# Patient Record
Sex: MALE | Employment: OTHER | URBAN - METROPOLITAN AREA
[De-identification: names, ages, dates, MRNs, and addresses within clinical notes are randomized per-mention and may not be internally consistent; named-entity substitution may affect disease eponyms.]

---

## 2019-11-18 ENCOUNTER — TRANSFERRED RECORDS (OUTPATIENT)
Dept: PHYSICAL THERAPY | Facility: CLINIC | Age: 69
End: 2019-11-18

## 2019-12-03 ENCOUNTER — THERAPY VISIT (OUTPATIENT)
Dept: PHYSICAL THERAPY | Facility: CLINIC | Age: 69
End: 2019-12-03
Payer: COMMERCIAL

## 2019-12-03 DIAGNOSIS — M25.561 CHRONIC PAIN OF BOTH KNEES: ICD-10-CM

## 2019-12-03 DIAGNOSIS — G89.29 CHRONIC PAIN OF BOTH KNEES: ICD-10-CM

## 2019-12-03 DIAGNOSIS — M25.562 CHRONIC PAIN OF BOTH KNEES: ICD-10-CM

## 2019-12-03 DIAGNOSIS — Z96.651 HISTORY OF TOTAL KNEE ARTHROPLASTY, RIGHT: ICD-10-CM

## 2019-12-03 PROCEDURE — 97161 PT EVAL LOW COMPLEX 20 MIN: CPT | Mod: GP | Performed by: PHYSICAL THERAPIST

## 2019-12-03 PROCEDURE — 97110 THERAPEUTIC EXERCISES: CPT | Mod: GP | Performed by: PHYSICAL THERAPIST

## 2019-12-03 PROCEDURE — 97112 NEUROMUSCULAR REEDUCATION: CPT | Mod: GP | Performed by: PHYSICAL THERAPIST

## 2019-12-03 ASSESSMENT — ACTIVITIES OF DAILY LIVING (ADL)
GO DOWN STAIRS: ACTIVITY IS SOMEWHAT DIFFICULT
LIMPING: THE SYMPTOM AFFECTS MY ACTIVITY MODERATELY
KNEEL ON THE FRONT OF YOUR KNEE: ACTIVITY IS VERY DIFFICULT
PAIN: THE SYMPTOM AFFECTS MY ACTIVITY SEVERELY
RAW_SCORE: 24
GIVING WAY, BUCKLING OR SHIFTING OF KNEE: THE SYMPTOM AFFECTS MY ACTIVITY SEVERELY
WEAKNESS: THE SYMPTOM AFFECTS MY ACTIVITY SEVERELY
KNEE_ACTIVITY_OF_DAILY_LIVING_SUM: 24
STIFFNESS: THE SYMPTOM AFFECTS MY ACTIVITY SEVERELY
AS_A_RESULT_OF_YOUR_KNEE_INJURY,_HOW_WOULD_YOU_RATE_YOUR_CURRENT_LEVEL_OF_DAILY_ACTIVITY?: NOT ANSWERED
STAND: ACTIVITY IS FAIRLY DIFFICULT
HOW_WOULD_YOU_RATE_THE_OVERALL_FUNCTION_OF_YOUR_KNEE_DURING_YOUR_USUAL_DAILY_ACTIVITIES?: ABNORMAL
SIT WITH YOUR KNEE BENT: ACTIVITY IS FAIRLY DIFFICULT
SQUAT: ACTIVITY IS VERY DIFFICULT
RISE FROM A CHAIR: ACTIVITY IS VERY DIFFICULT
GO UP STAIRS: ACTIVITY IS FAIRLY DIFFICULT
KNEE_ACTIVITY_OF_DAILY_LIVING_SCORE: 34.29
WALK: ACTIVITY IS FAIRLY DIFFICULT
SWELLING: I HAVE THE SYMPTOM BUT IT DOES NOT AFFECT MY ACTIVITY

## 2019-12-03 NOTE — LETTER
[facility]THLETIC [facility] PHYSICAL THERAPY  [address]  [place] [address]  [phone]    2019    Re: Espinoza Copeland   :   1950  MRN:  [id number]   REFERRING PHYSICIAN:   Jarod Sheth    [facility]THLETIC [facility] PHYSICAL [facility]    Date of Initial Evaluation:  2019  Visits:  Rxs Used: 1  Reason for Referral:     Chronic pain of both knees  History of total knee arthroplasty, right    EVALUATION SUMMARY    Subjective:  Espinoza Copeland being seen for B knee pain.   Problem began 2019 (MD visit). Problem occurred: 3-4 months ago B knee pain  and reported as 4/10 on pain scale. General health as reported by patient is fair. Pertinent medical history includes:  Cancer, diabetes, osteoarthritis, rheumatoid arthritis and numbness/tingling (diabetic neuropathy tingling in B feet. numbness just around R knee since TKA).   Other medical allergies details: none.  Surgeries include:  Cancer surgery. Other surgery history details: bladder cancer.  Current medications:  Other (metformin, glipzoid, lorsatan).     Pain is described as shooting and aching and is intermittent.  Since onset symptoms are gradually worsening. Special tests:  X-ray (ruled out bone cancer).     Patient is retired.   Barriers include:  Stairs.  Red flags:  Pain at rest/night.  Type of problem:  Bilateral knees  Condition occurred with:  Insidious onset. This is a chronic condition    Patient reports pain:  Anterior and lateral. Radiates to:  Lower leg and thigh (anterior thigh, anterior shins). Associated symptoms:  Loss of motion/stiffness and loss of strength (clicking B knees, balance difficulty). Symptoms are exacerbated by ascending stairs, bending/squatting and walking (must use hands to get up from recliner chair, descending stairs one step at a time, walking concrete floors) and relieved by nothing (doesn't try anything).                   Objective:  Gait:    Gait Type:  Antalgic   Weight Bearing Status:  WBAT   Assistive Devices:  None  Flexibility/Screens:   Lower Extremity:  Decreased left lower extremity flexibility:Hamstrings; Gastroc and Soleus  Decreased right lower extremity flexibility:  Hamstrings; Gastroc and Soleus        Re: Espinoza Copeland   :   1950       Knee Evaluation:  ROM:  Prom wnl knee: tightness R>L gastroc soleus complex.  Strength wnl knee: MMT hip ABD L 4/5, R 3+/5, hip ext B 5-/5.  Extensor sag with B flexion SLR, improves with cueing continued tendency for lag.  AROM  Extension:  Left: 0    Right:  0  Flexion: Left: 133    Right: 124  PROM  Hyperextension: Left: 4    Right:  5  Strength:   Extension:  Left: 5/5   Pain:      Right: 5/5   Pain:  Flexion:  Left: 5/5   Pain:      Right: 5/5   Pain:    Quad Set Left: Good    Pain:   Quad Set Right: Good    Pain:  Ligament Testing:  Ligament testing knee: shift noted with return of valgus strain to R knee in 30 degrees of flexion.  Varus 0:  Left:  Neg   Right:  Neg  Varus 30:  Left:  Neg  Right:  Neg  Valgus 0:  Left:  Neg  Right:  Neg  Valgus 30:  Left:  Neg    Right:  Trace  Anterior Drawer:  Left:  Neg    Right:  Neg  Posterior Drawer: Left:  Neg  Right:  Neg  Special Tests:   Left knee positive for the following special tests:  Patellar Compression  Left knee negative for the following special tests:  Meninscal  Right knee negative for the following special tests:  Patellar Compression  Edema:  Edema of the knee: B ankle edema noted, some bruising around ankles (pt says has had for years).  Skin not red or taught.  Functional Testing:    Quad:    Single Leg Squat:  Left:       Right:        Bilateral Leg Squat:  B functional valgus, audible crepitus B knees  Excessive anterior knee excursion, hip substitution and femoral IR    Proprioception:   Stork Balance Test:  Left:  1 seconds hip drop  Right:  2 seconds hip drop  % of Uninvolved:     Assessment/Plan:     Patient is a 69 year old male with B  knee complaints.    Patient has the following significant findings with corresponding treatment plan.                Diagnosis 1:  B knee pain, R pes anserine bursitis, h/o R TKA  Pain -  hot/cold therapy, manual therapy, self management, education and home program  Decreased ROM/flexibility - manual therapy, therapeutic exercise and home program  Decreased strength - therapeutic exercise, therapeutic activities and home program  Impaired balance - neuro re-education, gait training, therapeutic activities and home program  Decreased proprioception - neuro re-education, gait training, therapeutic activities and home program  Re: Espinoza Copeland   :   1950    Edema - cold therapy and self management/home program  Impaired gait - gait training and home program  Decreased function - therapeutic activities and home program    Therapy Evaluation Codes:        Cumulative Therapy Evaluation is: Low complexity.    Previous and current functional limitations:  (See Goal Flow Sheet for this information)    Short term and Long term goals: (See Goal Flow Sheet for this information)     Communication ability:  Patient appears to be able to clearly communicate and understand verbal and written communication and follow directions correctly.  Treatment Explanation - The following has been discussed with the patient:   RX ordered/plan of care  Anticipated outcomes  Possible risks and side effects  This patient would benefit from PT intervention to resume normal activities.   Rehab potential is good.    Frequency:  1 X week, once daily  Duration:  for 10 weeks  Discharge Plan:  Achieve all LTG.  Independent in home treatment program.  Reach maximal therapeutic benefit.    Thank you for your referral.    INQUIRIES  Therapist: JALEN ElizabethT  [facility] PHYSICAL THERAPY  [address] [address]  Phone:  [phone]  Fax: [phone]

## 2019-12-03 NOTE — PROGRESS NOTES
[facility] Initial Evaluation  Subjective:    Espinoza Copeland being seen for B knee pain.   Problem began 11/18/2019 (MD visit). Problem occurred: 3-4 months ago B knee pain  and reported as 4/10 on pain scale. General health as reported by patient is fair. Pertinent medical history includes:  Cancer, diabetes, osteoarthritis, rheumatoid arthritis and numbness/tingling (diabetic neuropathy tingling in B feet. numbness just around R knee since TKA).   Other medical allergies details: none.  Surgeries include:  Cancer surgery. Other surgery history details: bladder cancer.  Current medications:  Other (metformin, glipzoid, lorsatan).     Pain is described as shooting and aching and is intermittent.  Since onset symptoms are gradually worsening. Special tests:  X-ray (ruled out bone cancer).     Patient is retired.   Barriers include:  Stairs.  Red flags:  Pain at rest/night.  Type of problem:  Bilateral knees   Condition occurred with:  Insidious onset. This is a chronic condition    Patient reports pain:  Anterior and lateral. Radiates to:  Lower leg and thigh (anterior thigh, anterior shins). Associated symptoms:  Loss of motion/stiffness and loss of strength (clicking B knees, balance difficulty). Symptoms are exacerbated by ascending stairs, bending/squatting and walking (must use hands to get up from recliner chair, descending stairs one step at a time, walking concrete floors) and relieved by nothing (doesn't try anything).                      Objective:    Gait:    Gait Type:  Antalgic   Weight Bearing Status:  WBAT   Assistive Devices:  None      Flexibility/Screens:       Lower Extremity:  Decreased left lower extremity flexibility:Hamstrings; Gastroc and Soleus    Decreased right lower extremity flexibility:  Hamstrings; Gastroc and Soleus                                                      Knee Evaluation:  ROM:  Prom wnl knee: tightness R>L gastroc soleus complex.  Strength wnl knee:  MMT hip ABD L 4/5, R 3+/5, hip ext B 5-/5.  Extensor sag with B flexion SLR, improves with cueing continued tendency for lag.  AROM      Extension:  Left: 0    Right:  0  Flexion: Left: 133    Right: 124  PROM    Hyperextension: Left: 4    Right:  5          Strength:     Extension:  Left: 5/5   Pain:      Right: 5/5   Pain:  Flexion:  Left: 5/5   Pain:      Right: 5/5   Pain:    Quad Set Left: Good    Pain:   Quad Set Right: Good    Pain:  Ligament Testing:  Ligament testing knee: shift noted with return of valgus strain to R knee in 30 degrees of flexion.  Varus 0:  Left:  Neg   Right:  Neg  Varus 30:  Left:  Neg  Right:  Neg  Valgus 0:  Left:  Neg  Right:  Neg  Valgus 30:  Left:  Neg    Right:  Trace  Anterior Drawer:  Left:  Neg    Right:  Neg  Posterior Drawer: Left:  Neg  Right:  Neg    Special Tests:   Left knee positive for the following special tests:  Patellar Compression  Left knee negative for the following special tests:  Meninscal    Right knee negative for the following special tests:  Patellar Compression    Edema:  Edema of the knee: B ankle edema noted, some bruising around ankles (pt says has had for years).  Skin not red or taught.      Functional Testing:          Quad:    Single Leg Squat:  Left:       Right:        Bilateral Leg Squat:  B functional valgus, audible crepitus B knees  Excessive anterior knee excursion, hip substitution and femoral IR      Proprioception:   Stork Balance Test:  Left:  1 seconds hip drop  Right:  2 seconds hip drop  % of Uninvolved:           General     ROS    Assessment/Plan:    Patient is a 69 year old male with B  knee complaints.    Patient has the following significant findings with corresponding treatment plan.                Diagnosis 1:  B knee pain, R pes anserine bursitis, h/o R TKA    Pain -  hot/cold therapy, manual therapy, self management, education and home program  Decreased ROM/flexibility - manual therapy, therapeutic exercise and home  program  Decreased strength - therapeutic exercise, therapeutic activities and home program  Impaired balance - neuro re-education, gait training, therapeutic activities and home program  Decreased proprioception - neuro re-education, gait training, therapeutic activities and home program  Edema - cold therapy and self management/home program  Impaired gait - gait training and home program  Decreased function - therapeutic activities and home program    Therapy Evaluation Codes:        Cumulative Therapy Evaluation is: Low complexity.    Previous and current functional limitations:  (See Goal Flow Sheet for this information)    Short term and Long term goals: (See Goal Flow Sheet for this information)     Communication ability:  Patient appears to be able to clearly communicate and understand verbal and written communication and follow directions correctly.  Treatment Explanation - The following has been discussed with the patient:   RX ordered/plan of care  Anticipated outcomes  Possible risks and side effects  This patient would benefit from PT intervention to resume normal activities.   Rehab potential is good.    Frequency:  1 X week, once daily  Duration:  for 10 weeks  Discharge Plan:  Achieve all LTG.  Independent in home treatment program.  Reach maximal therapeutic benefit.    Please refer to the daily flowsheet for treatment today, total treatment time and time spent performing 1:1 timed codes.

## 2019-12-10 ENCOUNTER — THERAPY VISIT (OUTPATIENT)
Dept: PHYSICAL THERAPY | Facility: CLINIC | Age: 69
End: 2019-12-10
Payer: COMMERCIAL

## 2019-12-10 DIAGNOSIS — G89.29 CHRONIC PAIN OF BOTH KNEES: ICD-10-CM

## 2019-12-10 DIAGNOSIS — M25.562 CHRONIC PAIN OF BOTH KNEES: ICD-10-CM

## 2019-12-10 DIAGNOSIS — Z96.651 HISTORY OF TOTAL KNEE ARTHROPLASTY, RIGHT: ICD-10-CM

## 2019-12-10 DIAGNOSIS — M25.561 CHRONIC PAIN OF BOTH KNEES: ICD-10-CM

## 2019-12-10 PROCEDURE — 97112 NEUROMUSCULAR REEDUCATION: CPT | Mod: GP | Performed by: PHYSICAL THERAPIST

## 2019-12-10 PROCEDURE — 97110 THERAPEUTIC EXERCISES: CPT | Mod: GP | Performed by: PHYSICAL THERAPIST

## 2019-12-17 ENCOUNTER — THERAPY VISIT (OUTPATIENT)
Dept: PHYSICAL THERAPY | Facility: CLINIC | Age: 69
End: 2019-12-17
Payer: COMMERCIAL

## 2019-12-17 DIAGNOSIS — G89.29 CHRONIC PAIN OF BOTH KNEES: ICD-10-CM

## 2019-12-17 DIAGNOSIS — Z96.651 HISTORY OF TOTAL KNEE ARTHROPLASTY, RIGHT: ICD-10-CM

## 2019-12-17 DIAGNOSIS — M25.562 CHRONIC PAIN OF BOTH KNEES: ICD-10-CM

## 2019-12-17 DIAGNOSIS — M25.561 CHRONIC PAIN OF BOTH KNEES: ICD-10-CM

## 2019-12-17 PROCEDURE — 97110 THERAPEUTIC EXERCISES: CPT | Mod: GP | Performed by: PHYSICAL THERAPIST

## 2019-12-17 PROCEDURE — 97112 NEUROMUSCULAR REEDUCATION: CPT | Mod: GP | Performed by: PHYSICAL THERAPIST

## 2020-01-07 ENCOUNTER — THERAPY VISIT (OUTPATIENT)
Dept: PHYSICAL THERAPY | Facility: CLINIC | Age: 70
End: 2020-01-07
Payer: COMMERCIAL

## 2020-01-07 DIAGNOSIS — G89.29 CHRONIC PAIN OF BOTH KNEES: ICD-10-CM

## 2020-01-07 DIAGNOSIS — M25.561 CHRONIC PAIN OF BOTH KNEES: ICD-10-CM

## 2020-01-07 DIAGNOSIS — M25.562 CHRONIC PAIN OF BOTH KNEES: ICD-10-CM

## 2020-01-07 DIAGNOSIS — Z96.651 HISTORY OF TOTAL KNEE ARTHROPLASTY, RIGHT: ICD-10-CM

## 2020-01-07 PROCEDURE — 97112 NEUROMUSCULAR REEDUCATION: CPT | Mod: GP | Performed by: PHYSICAL THERAPIST

## 2020-01-07 PROCEDURE — 97110 THERAPEUTIC EXERCISES: CPT | Mod: GP | Performed by: PHYSICAL THERAPIST

## 2020-01-14 ENCOUNTER — THERAPY VISIT (OUTPATIENT)
Dept: PHYSICAL THERAPY | Facility: CLINIC | Age: 70
End: 2020-01-14
Payer: COMMERCIAL

## 2020-01-14 DIAGNOSIS — G89.29 CHRONIC PAIN OF BOTH KNEES: ICD-10-CM

## 2020-01-14 DIAGNOSIS — M25.562 CHRONIC PAIN OF BOTH KNEES: ICD-10-CM

## 2020-01-14 DIAGNOSIS — M25.561 CHRONIC PAIN OF BOTH KNEES: ICD-10-CM

## 2020-01-14 DIAGNOSIS — Z96.651 HISTORY OF TOTAL KNEE ARTHROPLASTY, RIGHT: ICD-10-CM

## 2020-01-14 PROCEDURE — 97112 NEUROMUSCULAR REEDUCATION: CPT | Mod: GP | Performed by: PHYSICAL THERAPIST

## 2020-01-14 PROCEDURE — 97110 THERAPEUTIC EXERCISES: CPT | Mod: GP | Performed by: PHYSICAL THERAPIST

## 2020-01-21 ENCOUNTER — THERAPY VISIT (OUTPATIENT)
Dept: PHYSICAL THERAPY | Facility: CLINIC | Age: 70
End: 2020-01-21
Payer: COMMERCIAL

## 2020-01-21 DIAGNOSIS — M25.561 CHRONIC PAIN OF BOTH KNEES: ICD-10-CM

## 2020-01-21 DIAGNOSIS — M25.562 CHRONIC PAIN OF BOTH KNEES: ICD-10-CM

## 2020-01-21 DIAGNOSIS — Z96.651 HISTORY OF TOTAL KNEE ARTHROPLASTY, RIGHT: ICD-10-CM

## 2020-01-21 DIAGNOSIS — G89.29 CHRONIC PAIN OF BOTH KNEES: ICD-10-CM

## 2020-01-21 PROCEDURE — 97112 NEUROMUSCULAR REEDUCATION: CPT | Mod: GP | Performed by: PHYSICAL THERAPIST

## 2020-01-21 PROCEDURE — 97110 THERAPEUTIC EXERCISES: CPT | Mod: GP | Performed by: PHYSICAL THERAPIST

## 2020-01-28 ENCOUNTER — THERAPY VISIT (OUTPATIENT)
Dept: PHYSICAL THERAPY | Facility: CLINIC | Age: 70
End: 2020-01-28
Payer: COMMERCIAL

## 2020-01-28 DIAGNOSIS — M25.562 CHRONIC PAIN OF BOTH KNEES: ICD-10-CM

## 2020-01-28 DIAGNOSIS — Z96.651 HISTORY OF TOTAL KNEE ARTHROPLASTY, RIGHT: ICD-10-CM

## 2020-01-28 DIAGNOSIS — M25.561 CHRONIC PAIN OF BOTH KNEES: ICD-10-CM

## 2020-01-28 DIAGNOSIS — G89.29 CHRONIC PAIN OF BOTH KNEES: ICD-10-CM

## 2020-01-28 PROCEDURE — 97112 NEUROMUSCULAR REEDUCATION: CPT | Mod: GP | Performed by: PHYSICAL THERAPIST

## 2020-01-28 PROCEDURE — 97110 THERAPEUTIC EXERCISES: CPT | Mod: GP | Performed by: PHYSICAL THERAPIST

## 2020-02-04 ENCOUNTER — THERAPY VISIT (OUTPATIENT)
Dept: PHYSICAL THERAPY | Facility: CLINIC | Age: 70
End: 2020-02-04
Payer: COMMERCIAL

## 2020-02-04 DIAGNOSIS — Z96.651 HISTORY OF TOTAL KNEE ARTHROPLASTY, RIGHT: ICD-10-CM

## 2020-02-04 DIAGNOSIS — M25.562 CHRONIC PAIN OF BOTH KNEES: ICD-10-CM

## 2020-02-04 DIAGNOSIS — G89.29 CHRONIC PAIN OF BOTH KNEES: ICD-10-CM

## 2020-02-04 DIAGNOSIS — M25.561 CHRONIC PAIN OF BOTH KNEES: ICD-10-CM

## 2020-02-04 PROCEDURE — 97110 THERAPEUTIC EXERCISES: CPT | Mod: GP | Performed by: PHYSICAL THERAPIST

## 2020-02-04 PROCEDURE — 97112 NEUROMUSCULAR REEDUCATION: CPT | Mod: GP | Performed by: PHYSICAL THERAPIST

## 2020-02-04 ASSESSMENT — ACTIVITIES OF DAILY LIVING (ADL)
STIFFNESS: I HAVE THE SYMPTOM BUT IT DOES NOT AFFECT MY ACTIVITY
KNEE_ACTIVITY_OF_DAILY_LIVING_SUM: 60
LIMPING: I HAVE THE SYMPTOM BUT IT DOES NOT AFFECT MY ACTIVITY
RISE FROM A CHAIR: ACTIVITY IS NOT DIFFICULT
GO DOWN STAIRS: ACTIVITY IS NOT DIFFICULT
SQUAT: ACTIVITY IS MINIMALLY DIFFICULT
SIT WITH YOUR KNEE BENT: ACTIVITY IS NOT DIFFICULT
PAIN: I HAVE THE SYMPTOM BUT IT DOES NOT AFFECT MY ACTIVITY
WEAKNESS: THE SYMPTOM AFFECTS MY ACTIVITY SLIGHTLY
RAW_SCORE: 60
SWELLING: I DO NOT HAVE THE SYMPTOM
HOW_WOULD_YOU_RATE_THE_CURRENT_FUNCTION_OF_YOUR_KNEE_DURING_YOUR_USUAL_DAILY_ACTIVITIES_ON_A_SCALE_FROM_0_TO_100_WITH_100_BEING_YOUR_LEVEL_OF_KNEE_FUNCTION_PRIOR_TO_YOUR_INJURY_AND_0_BEING_THE_INABILITY_TO_PERFORM_ANY_OF_YOUR_USUAL_DAILY_ACTIVITIES?: 80
GO UP STAIRS: ACTIVITY IS MINIMALLY DIFFICULT
HOW_WOULD_YOU_RATE_THE_OVERALL_FUNCTION_OF_YOUR_KNEE_DURING_YOUR_USUAL_DAILY_ACTIVITIES?: NEARLY NORMAL
KNEEL ON THE FRONT OF YOUR KNEE: ACTIVITY IS MINIMALLY DIFFICULT
GIVING WAY, BUCKLING OR SHIFTING OF KNEE: I DO NOT HAVE THE SYMPTOM
WALK: ACTIVITY IS NOT DIFFICULT
AS_A_RESULT_OF_YOUR_KNEE_INJURY,_HOW_WOULD_YOU_RATE_YOUR_CURRENT_LEVEL_OF_DAILY_ACTIVITY?: NEARLY NORMAL
STAND: ACTIVITY IS SOMEWHAT DIFFICULT
KNEE_ACTIVITY_OF_DAILY_LIVING_SCORE: 85.71

## 2020-02-04 NOTE — PROGRESS NOTES
Subjective:  HPI       Knee Activity of Daily Living Score: 85.71            Objective:  System    Physical Exam    General     ROS    Assessment/Plan:    PROGRESS  REPORT    Progress reporting period is from 12/3/19 to 2/4/20, 10 visits.       SUBJECTIVE  Subjective changes noted by patient:  Had to change water pump on vehicle so this increased pain last week (kneeling, crawling on knees etc).  Overall Knee pain intensity is much less.  No longer having the sharp pains so is pleased with this, but does continue to have pain.  Is able to work into exercises, but on days that are very active may not fit them in.    Current Pain level: 3/10.     Initial Pain level: 10/10.   Changes in function:  Yes (See Goal flowsheet attached for changes in current functional level)  Adverse reaction to treatment or activity: None    OBJECTIVE  Changes noted in objective findings:  Yes:   AROM L 4-0-130, R 5-0-124.   MMT knee ext B 5/5, knee flex B 5/5, hip ABD L 4+/5, R 4/5, hip ext B 5-/5.    SLS L 10 sec, R 7 sec.    Able to perform sit <->  controlled manner no longer with functional valgus.    Tendency with sit to stand, stairs, ambulation for L>R toe out.    SL activities continued tendency for hip drop due to gluteal weakness, although is improving.    Continued intermittent extensor sag on L SLR flexion with fatigue, although again much improved overall.    Improved Knee Outcome Survey Activities of Daily Living Scale from 34% to 86%.     ASSESSMENT/PLAN  Updated problem list and treatment plan: Diagnosis 1:  B knee pain, pes anserine bursitis, h/o R TKA    Pain -  hot/cold therapy, manual therapy, self management, education and home program  Decreased ROM/flexibility - manual therapy, therapeutic exercise and home program  Decreased strength - therapeutic exercise, therapeutic activities and home program  Decreased proprioception - neuro re-education, gait training, therapeutic activities and home  program  Decreased function - therapeutic activities and home program  STG/LTGs have been met or progress has been made towards goals:  Yes (See Goal flow sheet completed today.)  Assessment of Progress: The patient's condition is improving.  Patient is meeting short term goals and is progressing towards long term goals.  Self Management Plans:  Patient has been instructed in a home treatment program.  Patient  has been instructed in self management of symptoms.  I have re-evaluated this patient and find that the nature, scope, duration and intensity of the therapy is appropriate for the medical condition of the patient.  Espinoza continues to require the following intervention to meet STG and LTG's:  PT    Recommendations:  This patient would benefit from continued therapy, then progression to independence.     Frequency:  2 X a month, once daily  Duration:  for 1 month        Please refer to the daily flowsheet for treatment today, total treatment time and time spent performing 1:1 timed codes.

## 2020-02-04 NOTE — LETTER
[facility] ATHLETIC [facility] PHYSICAL THERAPY  [address]  [place] [address]  [phone]    2020    Re: Espinoza Copeland   :   1950  MRN:  [id number]   REFERRING PHYSICIAN:   Jarod Sheth    [facility] PHYSICAL THERAPY    Date of Initial Evaluation:  2019  Visits:  Rxs Used: 8  Reason for Referral:     Chronic pain of both knees  History of total knee arthroplasty, right    PROGRESS  REPORT  Progress reporting period is from 12/3/19 to 20, 10 visits.       SUBJECTIVE  Subjective changes noted by patient:  Had to change water pump on vehicle so this increased pain last week (kneeling, crawling on knees etc).  Overall Knee pain intensity is much less.  No longer having the sharp pains so is pleased with this, but does continue to have pain.  Is able to work into exercises, but on days that are very active may not fit them in.    Current Pain level: 3/10.     Initial Pain level: 10/10.   Changes in function:  Yes (See Goal flowsheet attached for changes in current functional level)  Adverse reaction to treatment or activity: None  HPI  Knee Activity of Daily Living Score: 85.71    OBJECTIVE  Changes noted in objective findings:  Yes:  AROM L 4-0-130, R 5-0-124.   MMT knee ext B 5/5, knee flex B 5/5, hip ABD L 4+/5, R 4/5, hip ext B 5-/5.    SLS L 10 sec, R 7 sec.    Able to perform sit <->  controlled manner no longer with functional valgus.    Tendency with sit to stand, stairs, ambulation for L>R toe out.    SL activities continued tendency for hip drop due to gluteal weakness, although is improving.    Continued intermittent extensor sag on L SLR flexion with fatigue, although again much improved overall.   Improved Knee Outcome Survey Activities of Daily Living Scale from 34% to 86%.     ASSESSMENT/PLAN  Updated problem list and treatment plan: Diagnosis 1:  B knee pain, pes anserine  bursitis, h/o R TKA  Re: Espinoza Copeland   :   1950      Pain -  hot/cold therapy, manual therapy, self management, education and home program  Decreased ROM/flexibility - manual therapy, therapeutic exercise and home program  Decreased strength - therapeutic exercise, therapeutic activities and home program  Decreased proprioception - neuro re-education, gait training, therapeutic activities and home program  Decreased function - therapeutic activities and home program  STG/LTGs have been met or progress has been made towards goals:  Yes (See Goal flow sheet completed today.)  Assessment of Progress: The patient's condition is improving.  Patient is meeting short term goals and is progressing towards long term goals.  Self Management Plans:  Patient has been instructed in a home treatment program.  Patient  has been instructed in self management of symptoms.  I have re-evaluated this patient and find that the nature, scope, duration and intensity of the therapy is appropriate for the medical condition of the patient.  Espinoza continues to require the following intervention to meet STG and LTG's:  PT    Recommendations:  This patient would benefit from continued therapy, then progression to independence.     Frequency:  2 X a month, once daily  Duration:  for 1 month    Thank you for your referral.      INQUIRIES  Therapist: Neetu Caruso DPT  [facility] [place] PHYSICAL THERAPY  [address] [address]  Phone: [phone]  Fax: [phone]

## 2020-04-10 ENCOUNTER — TELEPHONE (OUTPATIENT)
Dept: PHYSICAL THERAPY | Facility: CLINIC | Age: 70
End: 2020-04-10

## 2020-04-10 DIAGNOSIS — M25.562 CHRONIC PAIN OF BOTH KNEES: ICD-10-CM

## 2020-04-10 DIAGNOSIS — G89.29 CHRONIC PAIN OF BOTH KNEES: ICD-10-CM

## 2020-04-10 DIAGNOSIS — M25.561 CHRONIC PAIN OF BOTH KNEES: ICD-10-CM

## 2020-04-10 DIAGNOSIS — Z96.651 HISTORY OF TOTAL KNEE ARTHROPLASTY, RIGHT: ICD-10-CM

## 2020-04-10 NOTE — TELEPHONE ENCOUNTER
LM for pt to call back at [phone] to let us know his preference for continuing his PT during COVID-19 situation.

## 2020-04-17 ENCOUNTER — TELEPHONE (OUTPATIENT)
Dept: PHYSICAL THERAPY | Facility: CLINIC | Age: 70
End: 2020-04-17

## 2020-04-17 DIAGNOSIS — Z96.651 HISTORY OF TOTAL KNEE ARTHROPLASTY, RIGHT: ICD-10-CM

## 2020-04-17 DIAGNOSIS — M25.561 CHRONIC PAIN OF BOTH KNEES: ICD-10-CM

## 2020-04-17 DIAGNOSIS — M25.562 CHRONIC PAIN OF BOTH KNEES: ICD-10-CM

## 2020-04-17 DIAGNOSIS — G89.29 CHRONIC PAIN OF BOTH KNEES: ICD-10-CM

## 2020-04-17 NOTE — TELEPHONE ENCOUNTER
Spoke with patient and he notes that his knees have been feeling great, no more pain. He thinks he can be done with PT at this time.

## 2020-04-20 PROBLEM — M25.562 CHRONIC PAIN OF BOTH KNEES: Status: RESOLVED | Noted: 2019-12-03 | Resolved: 2020-04-20

## 2020-04-20 PROBLEM — Z96.651 HISTORY OF TOTAL KNEE ARTHROPLASTY, RIGHT: Status: RESOLVED | Noted: 2019-12-03 | Resolved: 2020-04-20

## 2020-04-20 PROBLEM — G89.29 CHRONIC PAIN OF BOTH KNEES: Status: RESOLVED | Noted: 2019-12-03 | Resolved: 2020-04-20

## 2020-04-20 PROBLEM — M25.561 CHRONIC PAIN OF BOTH KNEES: Status: RESOLVED | Noted: 2019-12-03 | Resolved: 2020-04-20
